# Patient Record
(demographics unavailable — no encounter records)

---

## 2025-01-23 NOTE — PLAN
[TextEntry] : - Hyperparathyroidism. Patient with calcium of 11.3, osteopenia, elevated urinary calcium. - Discussed surveillance and parathyroidectomy and recommended considering surgery. US in the office showed an enlarged R inferior parathyroid, consistent with the NM scan. - Risks and complications of the procedure discussed today. Will order a 4D CT.

## 2025-01-23 NOTE — HISTORY OF PRESENT ILLNESS
[de-identified] : 57 yro pt referred by Dr. Lindsay for eval of hyperparathyroidism. per chart noted, noted since 2020.  labs 12/14/24: calcium- 11.0 (H) , iPTH= 117 (H), Vit D = 28,  24hr urine calcium= 361 (H), calcium/creatine ratio= 282 (H)  labs 9/28/2024:  calcium- 11.3 (H) , iPTH= 129 (H) Denies h/o kidney stones or bone fractures. Not taking calcium supplements Quit smoking in 2010. Smoked cigs on/off for about 30 yrs.  Denies neck pain or throat pain, dysphagia, dyspnea, dysphonia. No fever, chills, Eating and drinking without issues. Unintentional weight loss of about 12 pounds in the last 3 yrs. Pt states she is losing muscle mass.  5/2024: DEXA axial showed low bone mass (osteopenia) in the left hip. 9/25/24: US renal: Normal renal sonogram with no stones or obstruction 11/5/2024 NM parathyroid imaging: abnormal parathyroid scan. Scintigraphic evidence of a parathyroid adenoma in the mid right thyroid lobe.

## 2025-01-23 NOTE — HISTORY OF PRESENT ILLNESS
[de-identified] : 57 yro pt referred by Dr. Lindsay for eval of hyperparathyroidism. per chart noted, noted since 2020.  labs 12/14/24: calcium- 11.0 (H) , iPTH= 117 (H), Vit D = 28,  24hr urine calcium= 361 (H), calcium/creatine ratio= 282 (H)  labs 9/28/2024:  calcium- 11.3 (H) , iPTH= 129 (H) Denies h/o kidney stones or bone fractures. Not taking calcium supplements Quit smoking in 2010. Smoked cigs on/off for about 30 yrs.  Denies neck pain or throat pain, dysphagia, dyspnea, dysphonia. No fever, chills, Eating and drinking without issues. Unintentional weight loss of about 12 pounds in the last 3 yrs. Pt states she is losing muscle mass.  5/2024: DEXA axial showed low bone mass (osteopenia) in the left hip. 9/25/24: US renal: Normal renal sonogram with no stones or obstruction 11/5/2024 NM parathyroid imaging: abnormal parathyroid scan. Scintigraphic evidence of a parathyroid adenoma in the mid right thyroid lobe.

## 2025-01-23 NOTE — CONSULT LETTER
[Dear  ___] : Dear  [unfilled], [Consult Letter:] : I had the pleasure of evaluating your patient, [unfilled]. [Please see my note below.] : Please see my note below. [Consult Closing:] : Thank you very much for allowing me to participate in the care of this patient.  If you have any questions, please do not hesitate to contact me. [Sincerely,] : Sincerely, [FreeTextEntry2] : Dr Lindsay [FreeTextEntry3] :  Trenton Eldridge MD, FACS  Otolaryngology-Head and Neck Surgery [place] [facility] [employer] [facility]

## 2025-01-23 NOTE — CONSULT LETTER
[Dear  ___] : Dear  [unfilled], [Consult Letter:] : I had the pleasure of evaluating your patient, [unfilled]. [Please see my note below.] : Please see my note below. [Consult Closing:] : Thank you very much for allowing me to participate in the care of this patient.  If you have any questions, please do not hesitate to contact me. [Sincerely,] : Sincerely, [FreeTextEntry2] : Dr Lindsay [FreeTextEntry3] :  Trenton Eldridge MD, FACS  Otolaryngology-Head and Neck Surgery [place] [facility] [employer] at [facility]

## 2025-03-20 NOTE — REASON FOR VISIT
[Post-Operative Visit] : a post-operative visit [FreeTextEntry2] : S/P right parathyroidectomy 2/25/2025

## 2025-03-20 NOTE — PHYSICAL EXAM
[Midline] : trachea located in midline position [Normal] : no rashes [de-identified] : Incision healing well.

## 2025-03-20 NOTE — HISTORY OF PRESENT ILLNESS
[de-identified] : 57 yro pt referred by Dr. Lindsay for eval of hyperparathyroidism. per chart noted, noted since 2020.  5/2024: DEXA axial showed low bone mass (osteopenia) in the left hip. 9/25/24: US renal: Normal renal sonogram with no stones or obstruction 11/5/2024 NM parathyroid imaging: abnormal parathyroid scan. Scintigraphic evidence of a parathyroid adenoma in the mid right thyroid lobe.  S/P right parathyroidectomy 2/25/2025  Today pt reports some soreness at the incision site and swelling still present. Denies dysphagia, dyspnea, dysphonia, or fever.    Pathology 2/25/2025 Specimen(s) Submitted 1-Right superior parathyroid  Final Diagnosis  1. Parathyroid, right superior, excision - Hypercellular parathyroid tissue with features consistent with parathyroid adenoma, clinical correlation is recommended  - 1 tiny benign lymph node

## 2025-03-20 NOTE — CONSULT LETTER
[Dear  ___] : Dear  [unfilled], [Consult Letter:] : I had the pleasure of evaluating your patient, [unfilled]. [Please see my note below.] : Please see my note below. [Consult Closing:] : Thank you very much for allowing me to participate in the care of this patient.  If you have any questions, please do not hesitate to contact me. [Sincerely,] : Sincerely, [FreeTextEntry2] : Dr Lindsay [FreeTextEntry3] :  Trenton Eldridge MD, FACS  Otolaryngology-Head and Neck Surgery [place] [facility] [employer] at [facility]

## 2025-03-20 NOTE — HISTORY OF PRESENT ILLNESS
[de-identified] : 57 yro pt referred by Dr. Lindsay for eval of hyperparathyroidism. per chart noted, noted since 2020.  5/2024: DEXA axial showed low bone mass (osteopenia) in the left hip. 9/25/24: US renal: Normal renal sonogram with no stones or obstruction 11/5/2024 NM parathyroid imaging: abnormal parathyroid scan. Scintigraphic evidence of a parathyroid adenoma in the mid right thyroid lobe.  S/P right parathyroidectomy 2/25/2025  Today pt reports some soreness at the incision site and swelling still present. Denies dysphagia, dyspnea, dysphonia, or fever.    Pathology 2/25/2025 Specimen(s) Submitted 1-Right superior parathyroid  Final Diagnosis  1. Parathyroid, right superior, excision - Hypercellular parathyroid tissue with features consistent with parathyroid adenoma, clinical correlation is recommended  - 1 tiny benign lymph node

## 2025-03-20 NOTE — PLAN
[TextEntry] : - Hyperparathyroidism. Patient with calcium of 11.3, osteopenia, elevated urinary calcium. - Discussed surveillance and parathyroidectomy and recommended considering surgery. US in the office showed an enlarged R inferior parathyroid, consistent with the NM scan. - S/P resection of R superior parathyroid. PTH dropped from 190 to 25 during the procedure. Incision healing well. - Will repeat labs in 4 weeks. - Return in 6 months.

## 2025-03-20 NOTE — CONSULT LETTER
[Dear  ___] : Dear  [unfilled], [Consult Letter:] : I had the pleasure of evaluating your patient, [unfilled]. [Please see my note below.] : Please see my note below. [Consult Closing:] : Thank you very much for allowing me to participate in the care of this patient.  If you have any questions, please do not hesitate to contact me. [Sincerely,] : Sincerely, [FreeTextEntry2] : Dr Lindsay [FreeTextEntry3] :  Trenton Eldridge MD, FACS  Otolaryngology-Head and Neck Surgery [place] [facility] [employer] at [facility]

## 2025-03-20 NOTE — PHYSICAL EXAM
[Midline] : trachea located in midline position [Normal] : no rashes [de-identified] : Incision healing well.